# Patient Record
Sex: MALE | Race: WHITE | HISPANIC OR LATINO | URBAN - METROPOLITAN AREA
[De-identification: names, ages, dates, MRNs, and addresses within clinical notes are randomized per-mention and may not be internally consistent; named-entity substitution may affect disease eponyms.]

---

## 2020-06-02 ENCOUNTER — OFFICE VISIT (OUTPATIENT)
Dept: URBAN - METROPOLITAN AREA CLINIC 63 | Facility: CLINIC | Age: 70
End: 2020-06-02

## 2021-09-03 ENCOUNTER — OFFICE VISIT (OUTPATIENT)
Dept: URBAN - METROPOLITAN AREA CLINIC 63 | Facility: CLINIC | Age: 71
End: 2021-09-03

## 2021-11-11 ENCOUNTER — OFFICE VISIT (OUTPATIENT)
Dept: URBAN - METROPOLITAN AREA SURGERY CENTER 4 | Facility: SURGERY CENTER | Age: 71
End: 2021-11-11

## 2021-11-29 ENCOUNTER — OFFICE VISIT (OUTPATIENT)
Dept: URBAN - METROPOLITAN AREA CLINIC 63 | Facility: CLINIC | Age: 71
End: 2021-11-29

## 2022-02-10 ENCOUNTER — OFFICE VISIT (OUTPATIENT)
Dept: URBAN - METROPOLITAN AREA SURGERY CENTER 4 | Facility: SURGERY CENTER | Age: 72
End: 2022-02-10

## 2022-02-24 ENCOUNTER — OFFICE VISIT (OUTPATIENT)
Dept: URBAN - METROPOLITAN AREA CLINIC 60 | Facility: CLINIC | Age: 72
End: 2022-02-24

## 2022-04-01 ENCOUNTER — OFFICE VISIT (OUTPATIENT)
Dept: URBAN - METROPOLITAN AREA SURGERY CENTER 4 | Facility: SURGERY CENTER | Age: 72
End: 2022-04-01

## 2022-04-05 LAB — PATHOLOGY (INDENTED REPORT): (no result)

## 2022-04-14 ENCOUNTER — OFFICE VISIT (OUTPATIENT)
Dept: URBAN - METROPOLITAN AREA CLINIC 60 | Facility: CLINIC | Age: 72
End: 2022-04-14

## 2022-07-09 ENCOUNTER — TELEPHONE ENCOUNTER (OUTPATIENT)
Dept: URBAN - METROPOLITAN AREA CLINIC 121 | Facility: CLINIC | Age: 72
End: 2022-07-09

## 2022-07-09 RX ORDER — PANTOPRAZOLE 20 MG/1
ONCE A DAY TABLET, DELAYED RELEASE ORAL ONCE A DAY
Refills: 3 | OUTPATIENT
Start: 2019-02-25 | End: 2019-03-26

## 2022-07-09 RX ORDER — PRAVASTATIN SODIUM 40 MG/1
TABLET ORAL
Refills: 0 | OUTPATIENT
Start: 2019-02-25 | End: 2019-03-26

## 2022-07-09 RX ORDER — OMEPRAZOLE 20 MG/1
TWICE A DAY CAPSULE, DELAYED RELEASE ORAL TWICE A DAY
Refills: 1 | OUTPATIENT
Start: 2019-11-18 | End: 2019-12-18

## 2022-07-09 RX ORDER — ESOMEPRAZOLE MAGNESIUM 20 MG/1
CAPSULE, DELAYED RELEASE ORAL
Refills: 0 | OUTPATIENT
Start: 2019-07-29 | End: 2019-11-18

## 2022-07-09 RX ORDER — OMEPRAZOLE 40 MG/1
TWICE A DAY CAPSULE, DELAYED RELEASE ORAL TWICE A DAY
Refills: 1 | OUTPATIENT
Start: 2018-10-18 | End: 2018-11-05

## 2022-07-09 RX ORDER — AMLODIPINE BESYLATE 5 MG/1
TABLET ORAL
Refills: 0 | OUTPATIENT
Start: 2019-08-31 | End: 2019-12-18

## 2022-07-09 RX ORDER — LISINOPRIL 40 MG/1
TABLET ORAL ONCE A DAY
Refills: 0 | OUTPATIENT
Start: 2019-03-26 | End: 2019-07-29

## 2022-07-09 RX ORDER — LISINOPRIL 40 MG/1
TABLET ORAL ONCE A DAY
Refills: 0 | OUTPATIENT
Start: 2016-09-12 | End: 2018-08-01

## 2022-07-09 RX ORDER — PRAVASTATIN SODIUM 40 MG/1
TABLET ORAL
Refills: 0 | OUTPATIENT
Start: 2019-11-18 | End: 2019-11-18

## 2022-07-09 RX ORDER — LISINOPRIL AND HYDROCHLOROTHIAZIDE TABLETS 20; 12.5 MG/1; MG/1
TABLET ORAL
Refills: 0 | OUTPATIENT
Start: 2019-08-31 | End: 2019-12-18

## 2022-07-09 RX ORDER — LISINOPRIL 40 MG/1
TABLET ORAL ONCE A DAY
Refills: 0 | OUTPATIENT
Start: 2018-11-05 | End: 2019-02-25

## 2022-07-09 RX ORDER — LISINOPRIL 40 MG/1
TABLET ORAL ONCE A DAY
Refills: 0 | OUTPATIENT
Start: 2019-11-18 | End: 2019-11-18

## 2022-07-09 RX ORDER — SIMVASTATIN 10 MG/1
TABLET, FILM COATED ORAL
Refills: 0 | OUTPATIENT
Start: 2018-06-11 | End: 2018-11-05

## 2022-07-09 RX ORDER — OMEPRAZOLE 20 MG/1
ONCE A DAY CAPSULE, DELAYED RELEASE ORAL ONCE A DAY
Refills: 3 | OUTPATIENT
Start: 2020-05-22 | End: 2020-10-06

## 2022-07-09 RX ORDER — ESOMEPRAZOLE MAGNESIUM 20 MG/1
TWICE A DAY CAPSULE, DELAYED RELEASE ORAL TWICE A DAY
Refills: 3 | OUTPATIENT
Start: 2019-03-26 | End: 2019-03-26

## 2022-07-09 RX ORDER — ESOMEPRAZOLE MAGNESIUM 20 MG/1
ONCE A DAY CAPSULE, DELAYED RELEASE ORAL ONCE A DAY
Refills: 3 | OUTPATIENT
Start: 2019-04-03 | End: 2019-07-29

## 2022-07-09 RX ORDER — SIMVASTATIN 10 MG/1
TABLET, FILM COATED ORAL
Refills: 0 | OUTPATIENT
Start: 2019-08-31 | End: 2019-12-18

## 2022-07-09 RX ORDER — LISINOPRIL 40 MG/1
TABLET ORAL ONCE A DAY
Refills: 0 | OUTPATIENT
Start: 2018-08-01 | End: 2018-11-05

## 2022-07-09 RX ORDER — OMEPRAZOLE 40 MG/1
CAPSULE, DELAYED RELEASE ORAL
Refills: 0 | OUTPATIENT
Start: 2018-07-16 | End: 2018-10-18

## 2022-07-09 RX ORDER — OMEPRAZOLE 20 MG/1
CAPSULE, DELAYED RELEASE ORAL ONCE A DAY
Refills: 0 | OUTPATIENT
Start: 2021-09-03 | End: 2022-04-14

## 2022-07-09 RX ORDER — SUCRALFATE 1 G/1
TABLET ORAL TWICE A DAY
Refills: 0 | OUTPATIENT
Start: 2018-11-05 | End: 2019-02-25

## 2022-07-09 RX ORDER — FAMOTIDINE 10 MG
ONCE A DAY TABLET ORAL ONCE A DAY
Refills: 3 | OUTPATIENT
Start: 2019-02-25 | End: 2019-03-26

## 2022-07-09 RX ORDER — LISINOPRIL 10 MG/1
TABLET ORAL ONCE A DAY
Refills: 0 | OUTPATIENT
Start: 2016-09-12 | End: 2016-09-12

## 2022-07-09 RX ORDER — LISINOPRIL 40 MG/1
TABLET ORAL ONCE A DAY
Refills: 0 | OUTPATIENT
Start: 2019-02-25 | End: 2019-03-26

## 2022-07-09 RX ORDER — PRAVASTATIN SODIUM 10 MG/1
TABLET ORAL ONCE A DAY
Refills: 0 | OUTPATIENT
Start: 2016-09-12 | End: 2018-08-01

## 2022-07-09 RX ORDER — LISINOPRIL 40 MG/1
TABLET ORAL ONCE A DAY
Refills: 0 | OUTPATIENT
Start: 2019-07-29 | End: 2019-11-18

## 2022-07-09 RX ORDER — PRAVASTATIN SODIUM 40 MG/1
TABLET ORAL
Refills: 0 | OUTPATIENT
Start: 2019-07-29 | End: 2019-11-18

## 2022-07-09 RX ORDER — OMEPRAZOLE 20 MG/1
ONCE A DAY CAPSULE, DELAYED RELEASE ORAL ONCE A DAY
Refills: 0 | OUTPATIENT
Start: 2022-04-14 | End: 2022-06-15

## 2022-07-09 RX ORDER — PRAVASTATIN SODIUM 40 MG/1
TABLET ORAL
Refills: 0 | OUTPATIENT
Start: 2019-03-26 | End: 2019-07-29

## 2022-07-09 RX ORDER — SUCRALFATE 1 G/1
FOUR TIMES A DAY TABLET ORAL
Refills: 0 | OUTPATIENT
Start: 2018-10-18 | End: 2018-11-05

## 2022-07-10 ENCOUNTER — TELEPHONE ENCOUNTER (OUTPATIENT)
Dept: URBAN - METROPOLITAN AREA CLINIC 121 | Facility: CLINIC | Age: 72
End: 2022-07-10

## 2022-07-10 RX ORDER — SUCRALFATE 1 G/1
TWICE A DAY TABLET ORAL TWICE A DAY
Refills: 2 | Status: ACTIVE | COMMUNITY
Start: 2019-03-26

## 2022-07-10 RX ORDER — AMLODIPINE BESYLATE 5 MG/1
TABLET ORAL ONCE A DAY
Refills: 0 | Status: ACTIVE | COMMUNITY
Start: 2021-09-03

## 2022-07-10 RX ORDER — LISINOPRIL AND HYDROCHLOROTHIAZIDE TABLETS 20; 12.5 MG/1; MG/1
TABLET ORAL ONCE A DAY
Refills: 0 | Status: ACTIVE | COMMUNITY
Start: 2021-09-03

## 2022-07-10 RX ORDER — PANTOPRAZOLE 20 MG/1
ONCE A DAY TABLET, DELAYED RELEASE ORAL ONCE A DAY
Refills: 0 | Status: ACTIVE | COMMUNITY
Start: 2018-11-07

## 2022-07-10 RX ORDER — AMLODIPINE BESYLATE 5 MG/1
TABLET ORAL
Refills: 0 | Status: ACTIVE | COMMUNITY
Start: 2019-12-18

## 2022-07-10 RX ORDER — OMEPRAZOLE 20 MG/1
ONCE A DAY CAPSULE, DELAYED RELEASE ORAL ONCE A DAY
Refills: 0 | Status: ACTIVE | COMMUNITY
Start: 2020-10-06

## 2022-07-10 RX ORDER — SIMVASTATIN 10 MG/1
TABLET, FILM COATED ORAL
Refills: 0 | Status: ACTIVE | COMMUNITY
Start: 2018-11-05

## 2022-07-10 RX ORDER — LISINOPRIL AND HYDROCHLOROTHIAZIDE TABLETS 20; 12.5 MG/1; MG/1
TABLET ORAL
Refills: 0 | Status: ACTIVE | COMMUNITY
Start: 2019-12-18

## 2022-07-10 RX ORDER — SIMVASTATIN 10 MG/1
TABLET, FILM COATED ORAL ONCE A DAY
Refills: 0 | Status: ACTIVE | COMMUNITY
Start: 2021-09-03

## 2022-07-10 RX ORDER — SIMVASTATIN 10 MG/1
TABLET, FILM COATED ORAL
Refills: 0 | Status: ACTIVE | COMMUNITY
Start: 2019-12-18

## 2022-07-10 RX ORDER — SUCRALFATE 1 G/1
TWICE A DAY TABLET ORAL TWICE A DAY
Refills: 1 | Status: ACTIVE | COMMUNITY
Start: 2019-07-29

## 2022-07-10 RX ORDER — OMEPRAZOLE 20 MG/1
TWICE A DAY CAPSULE, DELAYED RELEASE ORAL TWICE A DAY
Refills: 3 | Status: ACTIVE | COMMUNITY
Start: 2019-12-18

## 2022-07-10 RX ORDER — OMEPRAZOLE 20 MG/1
ONCE A DAY CAPSULE, DELAYED RELEASE ORAL ONCE A DAY
Refills: 3 | Status: ACTIVE | COMMUNITY
Start: 2022-06-15

## 2022-07-10 RX ORDER — OMEPRAZOLE 20 MG/1
ONCE A DAY CAPSULE, DELAYED RELEASE ORAL ONCE A DAY
Refills: 1 | Status: ACTIVE | COMMUNITY
Start: 2021-09-03

## 2023-11-27 ENCOUNTER — OFFICE VISIT (OUTPATIENT)
Dept: URBAN - METROPOLITAN AREA CLINIC 63 | Facility: CLINIC | Age: 73
End: 2023-11-27
Payer: MEDICARE

## 2023-11-27 ENCOUNTER — DASHBOARD ENCOUNTERS (OUTPATIENT)
Age: 73
End: 2023-11-27

## 2023-11-27 VITALS
HEIGHT: 65 IN | DIASTOLIC BLOOD PRESSURE: 80 MMHG | WEIGHT: 187.8 LBS | BODY MASS INDEX: 31.29 KG/M2 | HEART RATE: 59 BPM | TEMPERATURE: 96.1 F | OXYGEN SATURATION: 99 % | SYSTOLIC BLOOD PRESSURE: 130 MMHG

## 2023-11-27 DIAGNOSIS — Z86.010 PERSONAL HISTORY OF COLONIC POLYPS: ICD-10-CM

## 2023-11-27 PROCEDURE — 99214 OFFICE O/P EST MOD 30 MIN: CPT | Performed by: NURSE PRACTITIONER

## 2023-11-27 RX ORDER — LISINOPRIL 20 MG/1
TABLET ORAL
Qty: 100 TABLET | Status: ACTIVE | COMMUNITY

## 2023-11-27 RX ORDER — AMLODIPINE BESYLATE 5 MG/1
TABLET ORAL
Qty: 100 TABLET | Status: ACTIVE | COMMUNITY

## 2023-11-27 RX ORDER — PREDNISOLONE ACETATE 10 MG/ML
SUSPENSION/ DROPS OPHTHALMIC
Qty: 10 MILLILITER | Status: ACTIVE | COMMUNITY

## 2023-11-27 RX ORDER — OMEPRAZOLE 20 MG/1
CAPSULE, DELAYED RELEASE ORAL
Qty: 100 CAPSULE | Status: ACTIVE | COMMUNITY

## 2023-11-27 RX ORDER — TOBRAMYCIN 3 MG/ML
SOLUTION/ DROPS OPHTHALMIC
Qty: 5 MILLILITER | Status: ACTIVE | COMMUNITY

## 2023-11-27 RX ORDER — SIMVASTATIN 10 MG/1
TABLET, FILM COATED ORAL
Qty: 100 TABLET | Status: ACTIVE | COMMUNITY

## 2023-11-27 NOTE — HPI-TODAY'S VISIT:
11/23 Patient comes for screening colonoscopy, denies family h/o colon CRC, denies alarm symptoms, Will do colonoscopy. 8/18: Patient comes after colonoscopy with suboptimal, prep with internal hemorrhoids, will do colonoscopy in 5  years., may consider to repeat colonoscopy earlier. 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 On  May29 th, 2018.  Will plan EGD  and colonoscopy in 2 to 3 months.Discussion [Use for free text].Discussed dietary restrictions pertaining to Gastritis Information sheet reviewed and a copy provided. 11/18Patient with EGD with evidence of chronic gastritis, duodenitis, esophagitis. No evidence of gastric tumor. Colonoscopy with evidence of one tubular adenoma, internal hemorrhoids and diverticular disease. He said he is feeling better of his symptoms of gastritis, we will lower his medication dosis, and we will DC then ASAP due to his renal condition.Plan:Pantoprazole one at day for one month.Carafate will decrease doses to one at day. For 10 days and will DC it. Continue with diet.2/19Patient here today  is doing better, he said reflux and gastritis are better but still having symptoms two or 3 times at week. Patient will resume diet and treatment for reflux and gastritis. Will plan for  EGD in his next visit. Will check renal function before resume treatment with Carafate.  3/19Patient said is doing better of his reflux and gastritis symptoms. EGD with evidence of Esophagitis grade C, acute on chronic gastritis, and duodenitis. Colonoscopy with evidence of internal hemorrhoids, one tubular adenoma polyp. Patient renal function is normal. Patient will do diet and will do treatment with esomeprazole.  7/19Patient here today in good general state, he said he was doing fine but, recently he noticed some symptoms of gastritis and reflux again.  Patient had medical history of recent EGD positive for: Esophagitis, gastritis, and duodenitis.Plan:Patient will do diet, and resume treatment for gastritis, duodenitis and reflux.Will consider EGD surveillance for his esophagitis in 4 months. 11/19 Patient here today in good general state he said is feeling better of his reflux and gastritis but is still having symptoms above described at times.  Patient with medical history of LA grade C reflux esophagitis.  He said he is eating spicy at times but less amount, he's also running out of medication.Plan:Patient will resume diet and treatment with PPI, he also will have surveillance EGD. 11/18 Patient with EGD with evidence of chronic gastritis, duodenitis, esophagitis. No evidence of gastric tumor. Colonoscopy with evidence of one tubular adenoma, internal hemorrhoids and diverticular disease. He said he is feeling better of his symptoms of gastritis, we will lower his medication doses, and we will DC then ASAP due to his renal condition.Plan:Pantoprazole one at day for one month.Carafate will decrease doses to one at day. For 10 days and will DC it. Continue with diet.2/19Patient here today  is doing better, he said reflux and gastritis are better but still having symptoms two or 3 times at week. Patient will resume diet and treatment for reflux and gastritis. Will plan for  EGD in his next visit. Will check renal function before resume treatment with Carafate.  3/19 Patient said is doing better of his reflux and gastritis symptoms. EGD with evidence of Esophagitis grade C, acute on chronic gastritis, and duodenitis. Colonoscopy with evidence of internal hemorrhoids, one tubular adenoma polyp. Patient renal function is normal. Patient will do diet and will do treatment with esomeprazole.  7/19 Patient here today in good general state, he said he was doing fine but, recently he noticed some symptoms of gastritis and reflux again.  Patient had medical history of recent EGD positive for: Esophagitis, gastritis, and duodenitis.Plan:Patient will do diet, and resume treatment for gastritis, duodenitis and reflux.Will consider EGD surveillance for his esophagitis in 4 months. 11/19 Patient here today in good general state he said is feeling better of his reflux and gastritis but is still having symptoms above described at times.  Patient with medical history of LA grade C reflux esophagitis.  He said he is eating spicy at times but less amount, he's also running out of medication.Plan:Patient will resume diet and treatment with PPI, he also will have surveillance EGD. 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 Biopsy results: Duodenal mucosa with no significant abnormality.  The stomach, antrum, and body biopsy showed mild chronic inactive gastritis, negative for H. pylori.  Lower esophageal mucosa squamous columnar junction mucosa with acute and chronic inflammation and reflux type changes, negative for Frank esophagus.Patient will continue with diet and omeprazole 20 mg daily. 5/20Patient is complaining of symptoms of gastritis and reflux at times  related with meal intake.  His last EGD done on November 2019 showed evidence of LA grade an esophagitis, esophageal mucosal changes suspicious for Frank's esophagus, small H hernia, and chronic gastritis.  Biopsy results shows evidence of duodenal mucosa with intact villous architecture and no significant abnormality.  Stomach, antrum body biopsies show mild chronic inactive gastritis, negative for H. pylori.  Lower esophageal mucosa shows evidence of acute and chronic inflammation reflux type changes negative for intestinal metaplasia.Patient will resume diet and treatment with omeprazole. 9/21 Patient here today complaining of symptom of gastritis and reflux.  Patient has medical history of Frank esophagus, his last biopsy 2 years ago was negative for intestinal metaplasia positive for acute on chronic inflammation and reflux type changes.Plan:Patient will have EGD and will resume treatment with PPI and diet for GERD. 4/22 Patient's EGD shows evidence of esophageal mucosal changes secondary to established short segment of Frank esophagus, small hiatal hernia, chronic gastritis, a few gastric polyps.  Biopsy results: Duodenal mucosa with no specific histologic findings.  Stomach, body/antral biopsy fragments of benign gastric mucosa without significant inflammation, negative for H. pylori organism, positive for fundic gland polyp.  Lower third esophageal mucosa with mild reflux type changes and mild chronic inflammation, negative for intestinal metaplasia no evidence of dysplasia. Today patient is in good general state, has not GI complaints. He will continue with PPI and diet for esophagitis. 11/23 Patient here today for his surveillance colonoscopy.  Patient has medical history of adenoma polyp of the colon.  And history of Frank esophagus.  Last EGD was done on 2022 and the procedure shows negative for intestinal metaplasia of the esophageal mucosa.  Today he denies any upper GI symptoms.  Weight loss, rectal bleeding or change in his bowel habits.

## 2023-12-04 ENCOUNTER — LAB OUTSIDE AN ENCOUNTER (OUTPATIENT)
Dept: URBAN - METROPOLITAN AREA CLINIC 63 | Facility: CLINIC | Age: 73
End: 2023-12-04

## 2024-02-15 ENCOUNTER — COLON (OUTPATIENT)
Dept: URBAN - METROPOLITAN AREA SURGERY CENTER 4 | Facility: SURGERY CENTER | Age: 74
End: 2024-02-15

## 2024-02-22 ENCOUNTER — COLON (OUTPATIENT)
Dept: URBAN - METROPOLITAN AREA SURGERY CENTER 4 | Facility: SURGERY CENTER | Age: 74
End: 2024-02-22

## 2024-03-11 ENCOUNTER — COLON (OUTPATIENT)
Dept: URBAN - METROPOLITAN AREA SURGERY CENTER 4 | Facility: SURGERY CENTER | Age: 74
End: 2024-03-11

## 2024-03-11 ENCOUNTER — OV EP (OUTPATIENT)
Dept: URBAN - METROPOLITAN AREA CLINIC 63 | Facility: CLINIC | Age: 74
End: 2024-03-11

## 2024-03-25 ENCOUNTER — OV EP (OUTPATIENT)
Dept: URBAN - METROPOLITAN AREA CLINIC 63 | Facility: CLINIC | Age: 74
End: 2024-03-25

## 2024-03-25 ENCOUNTER — COLON (OUTPATIENT)
Dept: URBAN - METROPOLITAN AREA SURGERY CENTER 4 | Facility: SURGERY CENTER | Age: 74
End: 2024-03-25
Payer: MEDICARE

## 2024-03-25 ENCOUNTER — LAB (OUTPATIENT)
Dept: URBAN - METROPOLITAN AREA CLINIC 4 | Facility: CLINIC | Age: 74
End: 2024-03-25
Payer: MEDICARE

## 2024-03-25 DIAGNOSIS — D12.3 BENIGN NEOPLASM OF TRANSVERSE COLON: ICD-10-CM

## 2024-03-25 DIAGNOSIS — K64.1 SECOND DEGREE HEMORRHOIDS: ICD-10-CM

## 2024-03-25 DIAGNOSIS — K57.30 DIVERTICULOSIS OF LARGE INTESTINE WITHOUT PERFORATION OR ABSCESS WITHOUT BLEEDING: ICD-10-CM

## 2024-03-25 DIAGNOSIS — Z86.010 PERSONAL HISTORY OF COLONIC POLYPS: ICD-10-CM

## 2024-03-25 DIAGNOSIS — K63.5 POLYP OF TRANSVERSE COLON, UNSPECIFIED TYPE: ICD-10-CM

## 2024-03-25 PROCEDURE — 88305 TISSUE EXAM BY PATHOLOGIST: CPT | Performed by: PATHOLOGY

## 2024-03-25 PROCEDURE — 45380 COLONOSCOPY AND BIOPSY: CPT | Performed by: INTERNAL MEDICINE

## 2024-03-25 RX ORDER — AMLODIPINE BESYLATE 5 MG/1
TABLET ORAL
Qty: 100 TABLET | Status: ACTIVE | COMMUNITY

## 2024-03-25 RX ORDER — OMEPRAZOLE 20 MG/1
CAPSULE, DELAYED RELEASE ORAL
Qty: 100 CAPSULE | Status: ACTIVE | COMMUNITY

## 2024-03-25 RX ORDER — LISINOPRIL 20 MG/1
TABLET ORAL
Qty: 100 TABLET | Status: ACTIVE | COMMUNITY

## 2024-03-25 RX ORDER — SIMVASTATIN 10 MG/1
TABLET, FILM COATED ORAL
Qty: 100 TABLET | Status: ACTIVE | COMMUNITY

## 2024-03-25 RX ORDER — PREDNISOLONE ACETATE 10 MG/ML
SUSPENSION/ DROPS OPHTHALMIC
Qty: 10 MILLILITER | Status: ACTIVE | COMMUNITY

## 2024-03-25 RX ORDER — TOBRAMYCIN 3 MG/ML
SOLUTION/ DROPS OPHTHALMIC
Qty: 5 MILLILITER | Status: ACTIVE | COMMUNITY

## 2024-06-26 ENCOUNTER — OFFICE VISIT (OUTPATIENT)
Dept: URBAN - METROPOLITAN AREA CLINIC 63 | Facility: CLINIC | Age: 74
End: 2024-06-26

## 2024-07-03 ENCOUNTER — OFFICE VISIT (OUTPATIENT)
Dept: URBAN - METROPOLITAN AREA CLINIC 63 | Facility: CLINIC | Age: 74
End: 2024-07-03

## 2024-07-15 ENCOUNTER — OFFICE VISIT (OUTPATIENT)
Dept: URBAN - METROPOLITAN AREA CLINIC 63 | Facility: CLINIC | Age: 74
End: 2024-07-15
Payer: MEDICARE

## 2024-07-15 VITALS
HEART RATE: 59 BPM | DIASTOLIC BLOOD PRESSURE: 72 MMHG | OXYGEN SATURATION: 97 % | SYSTOLIC BLOOD PRESSURE: 135 MMHG | HEIGHT: 65 IN | BODY MASS INDEX: 31.16 KG/M2 | TEMPERATURE: 97.8 F | WEIGHT: 187 LBS

## 2024-07-15 DIAGNOSIS — Z86.010 PERSONAL HISTORY OF COLONIC POLYPS: ICD-10-CM

## 2024-07-15 DIAGNOSIS — D12.6 TUBULAR ADENOMA OF COLON: ICD-10-CM

## 2024-07-15 DIAGNOSIS — K57.30 DVRTCLOS OF LG INT W/O PERFORATION OR ABSCESS W/O BLEEDING: ICD-10-CM

## 2024-07-15 DIAGNOSIS — K21.00 GERD WITH ESOPHAGITIS WITHOUT BLEEDING: ICD-10-CM

## 2024-07-15 PROCEDURE — 99213 OFFICE O/P EST LOW 20 MIN: CPT | Performed by: INTERNAL MEDICINE

## 2024-07-15 RX ORDER — AMLODIPINE BESYLATE 5 MG/1
TABLET ORAL
Qty: 100 TABLET | Status: ACTIVE | COMMUNITY

## 2024-07-15 RX ORDER — SIMVASTATIN 10 MG/1
TABLET, FILM COATED ORAL
Qty: 100 TABLET | Status: ACTIVE | COMMUNITY

## 2024-07-15 RX ORDER — PREDNISOLONE ACETATE 10 MG/ML
SUSPENSION/ DROPS OPHTHALMIC
Qty: 10 MILLILITER | Status: ACTIVE | COMMUNITY

## 2024-07-15 RX ORDER — OMEPRAZOLE 20 MG/1
CAPSULE, DELAYED RELEASE ORAL
Qty: 100 CAPSULE | Status: ACTIVE | COMMUNITY

## 2024-07-15 RX ORDER — TOBRAMYCIN 3 MG/ML
SOLUTION/ DROPS OPHTHALMIC
Qty: 5 MILLILITER | Status: ACTIVE | COMMUNITY

## 2024-07-15 RX ORDER — LISINOPRIL 20 MG/1
TABLET ORAL
Qty: 100 TABLET | Status: ACTIVE | COMMUNITY

## 2024-07-15 NOTE — HPI-TODAY'S VISIT:
11/23 Patient comes for screening colonoscopy, denies family h/o colon CRC, denies alarm symptoms, Will do colonoscopy. 8/18: Patient comes after colonoscopy with suboptimal, prep with internal hemorrhoids, will do colonoscopy in 5  years., may consider to repeat colonoscopy earlier. 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 On  May29 th, 2018.  Will plan EGD  and colonoscopy in 2 to 3 months.Discussion [Use for free text].Discussed dietary restrictions pertaining to Gastritis Information sheet reviewed and a copy provided. 11/18Patient with EGD with evidence of chronic gastritis, duodenitis, esophagitis. No evidence of gastric tumor. Colonoscopy with evidence of one tubular adenoma, internal hemorrhoids and diverticular disease. He said he is feeling better of his symptoms of gastritis, we will lower his medication dosis, and we will DC then ASAP due to his renal condition.Plan:Pantoprazole one at day for one month.Carafate will decrease doses to one at day. For 10 days and will DC it. Continue with diet.2/19Patient here today  is doing better, he said reflux and gastritis are better but still having symptoms two or 3 times at week. Patient will resume diet and treatment for reflux and gastritis. Will plan for  EGD in his next visit. Will check renal function before resume treatment with Carafate.  3/19Patient said is doing better of his reflux and gastritis symptoms. EGD with evidence of Esophagitis grade C, acute on chronic gastritis, and duodenitis. Colonoscopy with evidence of internal hemorrhoids, one tubular adenoma polyp. Patient renal function is normal. Patient will do diet and will do treatment with esomeprazole.  7/19Patient here today in good general state, he said he was doing fine but, recently he noticed some symptoms of gastritis and reflux again.  Patient had medical history of recent EGD positive for: Esophagitis, gastritis, and duodenitis.Plan:Patient will do diet, and resume treatment for gastritis, duodenitis and reflux.Will consider EGD surveillance for his esophagitis in 4 months. 11/19 Patient here today in good general state he said is feeling better of his reflux and gastritis but is still having symptoms above described at times.  Patient with medical history of LA grade C reflux esophagitis.  He said he is eating spicy at times but less amount, he's also running out of medication.Plan:Patient will resume diet and treatment with PPI, he also will have surveillance EGD. 11/18 Patient with EGD with evidence of chronic gastritis, duodenitis, esophagitis. No evidence of gastric tumor. Colonoscopy with evidence of one tubular adenoma, internal hemorrhoids and diverticular disease. He said he is feeling better of his symptoms of gastritis, we will lower his medication doses, and we will DC then ASAP due to his renal condition.Plan:Pantoprazole one at day for one month.Carafate will decrease doses to one at day. For 10 days and will DC it. Continue with diet.2/19Patient here today  is doing better, he said reflux and gastritis are better but still having symptoms two or 3 times at week. Patient will resume diet and treatment for reflux and gastritis. Will plan for  EGD in his next visit. Will check renal function before resume treatment with Carafate.  3/19 Patient said is doing better of his reflux and gastritis symptoms. EGD with evidence of Esophagitis grade C, acute on chronic gastritis, and duodenitis. Colonoscopy with evidence of internal hemorrhoids, one tubular adenoma polyp. Patient renal function is normal. Patient will do diet and will do treatment with esomeprazole.  7/19 Patient here today in good general state, he said he was doing fine but, recently he noticed some symptoms of gastritis and reflux again.  Patient had medical history of recent EGD positive for: Esophagitis, gastritis, and duodenitis.Plan:Patient will do diet, and resume treatment for gastritis, duodenitis and reflux.Will consider EGD surveillance for his esophagitis in 4 months. 11/19 Patient here today in good general state he said is feeling better of his reflux and gastritis but is still having symptoms above described at times.  Patient with medical history of LA grade C reflux esophagitis.  He said he is eating spicy at times but less amount, he's also running out of medication.Plan:Patient will resume diet and treatment with PPI, he also will have surveillance EGD. 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 Biopsy results: Duodenal mucosa with no significant abnormality.  The stomach, antrum, and body biopsy showed mild chronic inactive gastritis, negative for H. pylori.  Lower esophageal mucosa squamous columnar junction mucosa with acute and chronic inflammation and reflux type changes, negative for Frank esophagus.Patient will continue with diet and omeprazole 20 mg daily. 5/20Patient is complaining of symptoms of gastritis and reflux at times  related with meal intake.  His last EGD done on November 2019 showed evidence of LA grade an esophagitis, esophageal mucosal changes suspicious for Frank's esophagus, small H hernia, and chronic gastritis.  Biopsy results shows evidence of duodenal mucosa with intact villous architecture and no significant abnormality.  Stomach, antrum body biopsies show mild chronic inactive gastritis, negative for H. pylori.  Lower esophageal mucosa shows evidence of acute and chronic inflammation reflux type changes negative for intestinal metaplasia.Patient will resume diet and treatment with omeprazole. 9/21 Patient here today complaining of symptom of gastritis and reflux.  Patient has medical history of Frank esophagus, his last biopsy 2 years ago was negative for intestinal metaplasia positive for acute on chronic inflammation and reflux type changes.Plan:Patient will have EGD and will resume treatment with PPI and diet for GERD. 4/22 Patient's EGD shows evidence of esophageal mucosal changes secondary to established short segment of Frank esophagus, small hiatal hernia, chronic gastritis, a few gastric polyps.  Biopsy results: Duodenal mucosa with no specific histologic findings.  Stomach, body/antral biopsy fragments of benign gastric mucosa without significant inflammation, negative for H. pylori organism, positive for fundic gland polyp.  Lower third esophageal mucosa with mild reflux type changes and mild chronic inflammation, negative for intestinal metaplasia no evidence of dysplasia. Today patient is in good general state, has not GI complaints. He will continue with PPI and diet for esophagitis. 11/23 Patient here today for his surveillance colonoscopy.  Patient has medical history of adenoma polyp of the colon.  And history of Frank esophagus.  Last EGD was done on 2022 and the procedure shows negative for intestinal metaplasia of the esophageal mucosa.  Today he denies any upper GI symptoms.  Weight loss, rectal bleeding or change in his bowel habits. 7/24: Patient has been seen in the office after his surveillance colonoscopy that was performed in March 2024 with a 5 mm polyp in the transverse colon that was removed with cold biopsy forcep, mild diverticulosis in the hepatic flexure, and internal hemorrhoids pathology report tubular adenomas and was recommended to repeat a colonoscopy in 5 years review recent labs at the beginning of the year in February 2024 with evidence of mild elevated glucose of 108 otherwise normal creatinine 1.05 normal LFTs.  Hemoglobin A1c is 6.0 was recommended to continue diet and follow-up with his PCP.

## 2024-08-22 ENCOUNTER — APPOINTMENT (RX ONLY)
Dept: URBAN - METROPOLITAN AREA CLINIC 335 | Facility: CLINIC | Age: 74
Setting detail: DERMATOLOGY
End: 2024-08-22

## 2024-08-22 DIAGNOSIS — L82.1 OTHER SEBORRHEIC KERATOSIS: ICD-10-CM | Status: STABLE

## 2024-08-22 DIAGNOSIS — B35.1 TINEA UNGUIUM: ICD-10-CM | Status: INADEQUATELY CONTROLLED

## 2024-08-22 DIAGNOSIS — I87.2 VENOUS INSUFFICIENCY (CHRONIC) (PERIPHERAL): ICD-10-CM | Status: INADEQUATELY CONTROLLED

## 2024-08-22 DIAGNOSIS — L81.4 OTHER MELANIN HYPERPIGMENTATION: ICD-10-CM | Status: STABLE

## 2024-08-22 DIAGNOSIS — Z12.83 ENCOUNTER FOR SCREENING FOR MALIGNANT NEOPLASM OF SKIN: ICD-10-CM

## 2024-08-22 DIAGNOSIS — D18.0 HEMANGIOMA: ICD-10-CM | Status: STABLE

## 2024-08-22 PROBLEM — D18.01 HEMANGIOMA OF SKIN AND SUBCUTANEOUS TISSUE: Status: ACTIVE | Noted: 2024-08-22

## 2024-08-22 PROCEDURE — ? FULL BODY SKIN EXAM

## 2024-08-22 PROCEDURE — ? COUNSELING

## 2024-08-22 PROCEDURE — ? TREATMENT REGIMEN

## 2024-08-22 PROCEDURE — ? ADDITIONAL NOTES

## 2024-08-22 PROCEDURE — ? PRESCRIPTION

## 2024-08-22 PROCEDURE — 99203 OFFICE O/P NEW LOW 30 MIN: CPT

## 2024-08-22 RX ORDER — TRIAMCINOLONE ACETONIDE 1 MG/G
CREAM TOPICAL BID
Qty: 80 | Refills: 0 | Status: ERX | COMMUNITY
Start: 2024-08-22

## 2024-08-22 RX ADMIN — TRIAMCINOLONE ACETONIDE: 1 CREAM TOPICAL at 00:00

## 2024-08-22 ASSESSMENT — LOCATION ZONE DERM
LOCATION ZONE: LEG
LOCATION ZONE: TOE
LOCATION ZONE: TRUNK
LOCATION ZONE: TOENAIL

## 2024-08-22 ASSESSMENT — LOCATION DETAILED DESCRIPTION DERM
LOCATION DETAILED: RIGHT GREAT TOENAIL
LOCATION DETAILED: LEFT DISTAL PRETIBIAL REGION
LOCATION DETAILED: LEFT LATERAL SUPERIOR CHEST
LOCATION DETAILED: LEFT DISTAL PLANTAR GREAT TOE

## 2024-08-22 ASSESSMENT — LOCATION SIMPLE DESCRIPTION DERM
LOCATION SIMPLE: LEFT GREAT TOE
LOCATION SIMPLE: RIGHT GREAT TOE
LOCATION SIMPLE: CHEST
LOCATION SIMPLE: LEFT PRETIBIAL REGION

## 2024-08-22 NOTE — PROCEDURE: ADDITIONAL NOTES
Additional Notes: Patient stated that his PCP is treating the fungal infections in his toenail
Detail Level: Simple
Render Risk Assessment In Note?: no

## 2024-11-19 ENCOUNTER — APPOINTMENT (RX ONLY)
Dept: URBAN - METROPOLITAN AREA CLINIC 335 | Facility: CLINIC | Age: 74
Setting detail: DERMATOLOGY
End: 2024-11-19

## 2024-11-19 DIAGNOSIS — I87.2 VENOUS INSUFFICIENCY (CHRONIC) (PERIPHERAL): ICD-10-CM

## 2024-11-19 PROCEDURE — 99214 OFFICE O/P EST MOD 30 MIN: CPT

## 2024-11-19 PROCEDURE — ? PRESCRIPTION MEDICATION MANAGEMENT

## 2024-11-19 PROCEDURE — ? COUNSELING

## 2024-11-19 PROCEDURE — ? PRESCRIPTION

## 2024-11-19 RX ORDER — TRIAMCINOLONE ACETONIDE 1 MG/G
CREAM TOPICAL BID
Qty: 80 | Refills: 0 | Status: ACTIVE

## 2024-11-19 ASSESSMENT — LOCATION DETAILED DESCRIPTION DERM: LOCATION DETAILED: LEFT DISTAL PRETIBIAL REGION

## 2024-11-19 ASSESSMENT — LOCATION ZONE DERM: LOCATION ZONE: LEG

## 2024-11-19 ASSESSMENT — LOCATION SIMPLE DESCRIPTION DERM: LOCATION SIMPLE: LEFT PRETIBIAL REGION

## 2024-11-19 ASSESSMENT — SEVERITY ASSESSMENT: SEVERITY: MILD

## 2024-11-19 NOTE — PROCEDURE: PRESCRIPTION MEDICATION MANAGEMENT
Continue Regimen: triamcinolone acetonide 0.1 % topical cream BID\\nQuantity: 80.0 g\\nSig: Apply thin layer to the legs once a day on Monday, Wednesday and Friday
Render In Strict Bullet Format?: No
Detail Level: Zone